# Patient Record
Sex: MALE | Race: WHITE
[De-identification: names, ages, dates, MRNs, and addresses within clinical notes are randomized per-mention and may not be internally consistent; named-entity substitution may affect disease eponyms.]

---

## 2018-03-04 ENCOUNTER — HOSPITAL ENCOUNTER (INPATIENT)
Dept: HOSPITAL 31 - C.ER | Age: 33
LOS: 2 days | Discharge: HOME | DRG: 89 | End: 2018-03-06
Attending: INTERNAL MEDICINE | Admitting: INTERNAL MEDICINE
Payer: MEDICAID

## 2018-03-04 VITALS — RESPIRATION RATE: 20 BRPM

## 2018-03-04 DIAGNOSIS — Z87.01: ICD-10-CM

## 2018-03-04 DIAGNOSIS — R82.71: ICD-10-CM

## 2018-03-04 DIAGNOSIS — J45.909: ICD-10-CM

## 2018-03-04 DIAGNOSIS — J18.9: Primary | ICD-10-CM

## 2018-03-04 LAB
ALBUMIN SERPL-MCNC: 4.5 G/DL (ref 3.5–5)
ALBUMIN/GLOB SERPL: 1.1 {RATIO} (ref 1–2.1)
ALT SERPL-CCNC: 43 U/L (ref 21–72)
APTT BLD: 34 SECONDS (ref 21–34)
AST SERPL-CCNC: 30 U/L (ref 17–59)
BACTERIA #/AREA URNS HPF: (no result) /[HPF]
BASE EXCESS BLDV CALC-SCNC: -4.9 MMOL/L (ref 0–2)
BASE EXCESS BLDV CALC-SCNC: 2.7 MMOL/L (ref 0–2)
BASOPHILS # BLD AUTO: 0 K/UL (ref 0–0.2)
BASOPHILS NFR BLD: 1 % (ref 0–2)
BILIRUB UR-MCNC: NEGATIVE MG/DL
BUN SERPL-MCNC: 12 MG/DL (ref 9–20)
CALCIUM SERPL-MCNC: 9.2 MG/DL (ref 8.6–10.4)
EOSINOPHIL # BLD AUTO: 0.1 K/UL (ref 0–0.7)
EOSINOPHIL NFR BLD: 1.2 % (ref 0–4)
ERYTHROCYTE [DISTWIDTH] IN BLOOD BY AUTOMATED COUNT: 13.1 % (ref 11.5–14.5)
GFR NON-AFRICAN AMERICAN: > 60
GLUCOSE UR STRIP-MCNC: NORMAL MG/DL
HGB BLD-MCNC: 15 G/DL (ref 12–18)
INR PPP: 1.2
LEUKOCYTE ESTERASE UR-ACNC: (no result) LEU/UL
LYMPHOCYTES # BLD AUTO: 1.3 K/UL (ref 1–4.3)
LYMPHOCYTES NFR BLD AUTO: 29.2 % (ref 20–40)
MAGNESIUM SERPL-MCNC: 2.1 MG/DL (ref 1.6–2.3)
MCH RBC QN AUTO: 25.8 PG (ref 27–31)
MCHC RBC AUTO-ENTMCNC: 33.7 G/DL (ref 33–37)
MCV RBC AUTO: 76.7 FL (ref 80–94)
MONOCYTES # BLD: 0.8 K/UL (ref 0–0.8)
MONOCYTES NFR BLD: 17.5 % (ref 0–10)
NEUTROPHILS # BLD: 2.3 K/UL (ref 1.8–7)
NEUTROPHILS NFR BLD AUTO: 51.1 % (ref 50–75)
NRBC BLD AUTO-RTO: 0.1 % (ref 0–2)
PCO2 BLDV: 27 MMHG (ref 40–60)
PCO2 BLDV: 50 MMHG (ref 40–60)
PH BLDV: 7.37 [PH] (ref 7.32–7.43)
PH BLDV: 7.43 [PH] (ref 7.32–7.43)
PH UR STRIP: 6 [PH] (ref 5–8)
PLATELET # BLD: 299 K/UL (ref 130–400)
PMV BLD AUTO: 7.3 FL (ref 7.2–11.7)
PROT UR STRIP-MCNC: NEGATIVE MG/DL
PROTHROMBIN TIME: 13.5 SECONDS (ref 9.7–12.2)
RBC # BLD AUTO: 5.83 MIL/UL (ref 4.4–5.9)
RBC # UR STRIP: (no result) /UL
SP GR UR STRIP: 1.02 (ref 1–1.03)
SQUAMOUS EPITHIAL: < 1 /HPF (ref 0–5)
URINE NITRATE: NEGATIVE
UROBILINOGEN UR-MCNC: NORMAL MG/DL (ref 0.2–1)
VENOUS BLOOD GAS PO2: 23 MM/HG (ref 30–55)
VENOUS BLOOD GAS PO2: 77 MM/HG (ref 30–55)
WBC # BLD AUTO: 4.5 K/UL (ref 4.8–10.8)

## 2018-03-04 RX ADMIN — DEXTROSE AND SODIUM CHLORIDE SCH MLS/HR: 5; 450 INJECTION, SOLUTION INTRAVENOUS at 22:45

## 2018-03-04 NOTE — C.PDOC
Time Seen by Provider: 03/04/18 15:25


Chief Complaint (Nursing): Fever


History Per: Patient


Onset/Duration Of Symptoms: Days (2)


Current Symptoms Are (Timing): Still Present


Associated Symptoms: Fever, Chills, Cough, Sputum


Severity: Moderate


Recent travel outside of the [place]: No


Additional History Per: Prior Records





Past Medical History


Reviewed: Historical Data, Nursing Documentation, Vital Signs


Vital Signs: 


 Last Vital Signs











Temp  98.4 F   03/04/18 17:37


 


Pulse  109 H  03/04/18 17:37


 


Resp  20   03/04/18 17:37


 


BP  127/77   03/04/18 17:37


 


Pulse Ox  97   03/04/18 17:37














- Medical History


PMH: Asthma


Family History: States: Unknown Family Hx





- Social History


Hx Tobacco Use: No


Hx Alcohol Use: No


Hx Substance Use: No





- Immunization History


Hx Tetanus Toxoid Vaccination: No


Hx Influenza Vaccination: No


Hx Pneumococcal Vaccination: No





Review Of Systems


Except As Marked, All Systems Reviewed And Found Negative.


Constitutional: Positive for: Fever, Chills, Weakness, Malaise


ENT: Positive for: Throat Pain


Respiratory: Positive for: Cough, Shortness of Breath, Sputum.  Negative for: 

Hemoptysis


Gastrointestinal: Negative for: Vomiting, Abdominal Pain, Diarrhea


Musculoskeletal: Negative for: Neck Pain


Skin: Negative for: Rash


Neurological: Negative for: Weakness, Numbness, Seizures, Altered Mental Status





Physical Exam





- Physical Exam


Appears: Non-toxic, No Acute Distress


Skin: Normal Color, Warm, Dry, No Rash


Head: Atraumatic, Normacephalic


Eye(s): bilateral: Normal Inspection, PERRL, EOMI


Oral Mucosa: Moist, No Drooling, No Trismus


Throat: Erythema, No Exudate, No Drooling, No Mass


Neck: Normal ROM, Supple


Cardiovascular: Rhythm Regular


Respiratory: No Accessory Muscle Use, Rhonchi (right)


Gastrointestinal/Abdominal: Soft, No Tenderness


Back: No CVA Tenderness


Extremity: Normal ROM


Neurological/Psych: Oriented x3, Normal Motor, Normal Sensation





ED Course And Treatment





- Laboratory Results


Result Diagrams: 


 03/04/18 16:22





 03/04/18 16:22


Interpretation Of Abnormal: Lactate 2.4


O2 Sat by Pulse Oximetry: 95


Pulse Ox Interpretation: Normal





- Radiology


CXR: Viewed By Me, Read By Radiologist


CXR Interpretation: Yes: Infiltrates (RML)





Progress





- Interventions


Interventions:: Observation, Intravenous fluid





- Medications Administered


Oral: Acetaminophen


Intravenous: Other (Abx)





- Data Reviewed


Data Reviewed: Lab, Diagnostic imaging, Old records





- Patient Status


Patient status: Partially improved





- Continuity of Care


Discussed patient case with:: Patient, ED Nurse, PMD





- Patient Plan


Patient Plan: Admission





Disposition


Discussed With : Brian Wild


Comment: He accepted pt on his service.


Doctor Will See Patient In The: Hospital


Counseled Patient/Family Regarding: Studies Performed, Diagnosis





- Disposition


Disposition: HOSPITALIZED


Disposition Time: 18:07


Condition: FAIR





- Clinical Impression


Clinical Impression: 


 Fever, Sepsis, Right middle lobe pneumonia

## 2018-03-04 NOTE — RAD
HISTORY:

Cough, fever  



COMPARISON:

No prior.



TECHNIQUE:

Chest PA and lateral



FINDINGS:



LUNGS:

Right middle lobe infiltrate.



PLEURA:

No significant pleural effusion identified. No pneumothorax apparent.



CARDIOVASCULAR:

Normal.



OSSEOUS STRUCTURES:

No significant abnormalities.



VISUALIZED UPPER ABDOMEN:

Normal.



OTHER FINDINGS:

None.



IMPRESSION:

Right middle lobe infiltrate.

## 2018-03-05 LAB
ALBUMIN SERPL-MCNC: 3.7 G/DL (ref 3.5–5)
ALBUMIN/GLOB SERPL: 1.2 {RATIO} (ref 1–2.1)
ALT SERPL-CCNC: 32 U/L (ref 21–72)
AST SERPL-CCNC: 26 U/L (ref 17–59)
BASOPHILS # BLD AUTO: 0.1 K/UL (ref 0–0.2)
BASOPHILS NFR BLD: 1.2 % (ref 0–2)
BUN SERPL-MCNC: 8 MG/DL (ref 9–20)
CALCIUM SERPL-MCNC: 8.6 MG/DL (ref 8.6–10.4)
EOSINOPHIL # BLD AUTO: 0.1 K/UL (ref 0–0.7)
EOSINOPHIL NFR BLD: 1.9 % (ref 0–4)
ERYTHROCYTE [DISTWIDTH] IN BLOOD BY AUTOMATED COUNT: 13.3 % (ref 11.5–14.5)
GFR NON-AFRICAN AMERICAN: > 60
HGB BLD-MCNC: 13.4 G/DL (ref 12–18)
LYMPHOCYTES # BLD AUTO: 1.8 K/UL (ref 1–4.3)
LYMPHOCYTES NFR BLD AUTO: 40.2 % (ref 20–40)
MCH RBC QN AUTO: 26 PG (ref 27–31)
MCHC RBC AUTO-ENTMCNC: 33.7 G/DL (ref 33–37)
MCV RBC AUTO: 77 FL (ref 80–94)
MONOCYTES # BLD: 0.7 K/UL (ref 0–0.8)
MONOCYTES NFR BLD: 15.7 % (ref 0–10)
NEUTROPHILS # BLD: 1.8 K/UL (ref 1.8–7)
NEUTROPHILS NFR BLD AUTO: 41 % (ref 50–75)
NRBC BLD AUTO-RTO: 0.1 % (ref 0–2)
PLATELET # BLD: 261 K/UL (ref 130–400)
PMV BLD AUTO: 7.6 FL (ref 7.2–11.7)
RBC # BLD AUTO: 5.15 MIL/UL (ref 4.4–5.9)
WBC # BLD AUTO: 4.4 K/UL (ref 4.8–10.8)

## 2018-03-05 RX ADMIN — IPRATROPIUM BROMIDE AND ALBUTEROL SULFATE SCH ML: .5; 3 SOLUTION RESPIRATORY (INHALATION) at 19:08

## 2018-03-05 RX ADMIN — PROMETHAZINE HYDROCHLORIDE PRN MG: 6.25 SYRUP ORAL at 10:03

## 2018-03-05 RX ADMIN — PROMETHAZINE HYDROCHLORIDE PRN MG: 6.25 SYRUP ORAL at 18:47

## 2018-03-05 RX ADMIN — IPRATROPIUM BROMIDE AND ALBUTEROL SULFATE SCH ML: .5; 3 SOLUTION RESPIRATORY (INHALATION) at 11:13

## 2018-03-05 RX ADMIN — FLUTICASONE PROPIONATE AND SALMETEROL SCH: 50; 250 POWDER RESPIRATORY (INHALATION) at 11:13

## 2018-03-05 RX ADMIN — DEXTROSE AND SODIUM CHLORIDE SCH MLS/HR: 5; 450 INJECTION, SOLUTION INTRAVENOUS at 18:48

## 2018-03-05 RX ADMIN — ENOXAPARIN SODIUM SCH MG: 40 INJECTION SUBCUTANEOUS at 09:23

## 2018-03-05 RX ADMIN — IPRATROPIUM BROMIDE AND ALBUTEROL SULFATE SCH: .5; 3 SOLUTION RESPIRATORY (INHALATION) at 16:00

## 2018-03-05 RX ADMIN — IPRATROPIUM BROMIDE AND ALBUTEROL SULFATE SCH ML: .5; 3 SOLUTION RESPIRATORY (INHALATION) at 00:53

## 2018-03-05 RX ADMIN — IPRATROPIUM BROMIDE AND ALBUTEROL SULFATE SCH ML: .5; 3 SOLUTION RESPIRATORY (INHALATION) at 07:38

## 2018-03-05 RX ADMIN — IPRATROPIUM BROMIDE AND ALBUTEROL SULFATE SCH ML: .5; 3 SOLUTION RESPIRATORY (INHALATION) at 04:21

## 2018-03-05 RX ADMIN — DEXTROSE AND SODIUM CHLORIDE SCH MLS/HR: 5; 450 INJECTION, SOLUTION INTRAVENOUS at 08:48

## 2018-03-05 NOTE — CP.PCM.PN
Subjective





- Date & Time of Evaluation


Date of Evaluation: 03/05/18


Time of Evaluation: 07:05





- Subjective


Subjective: 





PGY2 Resident - Medicine Progress Note





This 33 year old Male with PMHx of Asthma, presents to the ED c/o fevers, chills

, and cough for the past 3 days. 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 

been outdoors frequently with the changes in weather, and began to feel 

extremely ill this past Friday night. He did not record a home temperature, 

however admits to diaphoresis, chills, and overall lethargy. Saturday he began 

to cough intermittently, stating that whenever he gets sick, his asthma worsens

, causing him to cough. He has not tried any OTC medications. His asthma pump 

has helped his cough, however the fevers / chills have persisted. He denies any 

sick contacts.  Denies chest pain, abdominal pain, nausea / vomiting, diarrhea 

/ constipation, change in urination, urinary frequency, dysuria, or any 

additional acute complaints.





PMD: Dr. Wild


PMHx: Asthma


PSHx: none


Meds: see EMR


SocHx: Denies ETOH, denies tobacco, denies illicit drug use








Objective





- Vital Signs/Intake and Output


Vital Signs (last 24 hours): 


 











Temp Pulse Resp BP Pulse Ox


 


 98.7 F   103 H  20   112/74   96 


 


 03/05/18 08:11  03/05/18 08:11  03/05/18 08:11  03/05/18 08:11  03/05/18 08:11








Intake and Output: 


 











 03/05/18 03/05/18





 06:59 18:59


 


Intake Total 1550 


 


Output Total 250 


 


Balance 1300 














- Medications


Medications: 


 Current Medications





Acetaminophen (Tylenol 325mg Tab)  650 mg PO Q4 PRN


   PRN Reason: Fever >100.4 F


Albuterol/Ipratropium (Duoneb 3 Mg/0.5 Mg (3 Ml) Ud)  3 ml INH RQ4 [facility]


   Last Admin: 03/05/18 07:38 Dose:  3 ml


Enoxaparin Sodium (Lovenox)  40 mg SC DAILY [facility]


   Last Admin: 03/05/18 09:23 Dose:  40 mg


Ceftriaxone Sodium 1 gm/ (Sodium Chloride)  100 mls @ 100 mls/hr IVPB DAILY [facility]


   Last Admin: 03/05/18 09:23 Dose:  100 mls/hr


Azithromycin 500 mg/ Sodium (Chloride)  250 mls @ 166.667 mls/hr IVPB DAILY [facility]


Dextrose/Sodium Chloride (Dextrose 5%/0.45% Ns 1000 Ml)  1,000 mls @ 100 mls/hr 

IV .Q10H [facility]


   Last Admin: 03/05/18 08:48 Dose:  100 mls/hr


Promethazine HCl (Phenergan Syrup)  12.5 mg PO Q6 PRN


   PRN Reason: Cough


   Last Admin: 03/05/18 10:03 Dose:  12.5 mg











- Labs


Labs: 


 





 03/05/18 06:33 





 03/05/18 06:33 





 











PT  13.5 SECONDS (9.7-12.2)  H  03/04/18  16:22    


 


INR  1.2   03/04/18  16:22    


 


APTT  34 SECONDS (21-34)   03/04/18  16:22    














- Constitutional


Appears: Non-toxic, No Acute Distress





- Head Exam


Head Exam: ATRAUMATIC, NORMAL INSPECTION





- Eye Exam


Eye Exam: EOMI, Normal appearance





- ENT Exam


ENT Exam: Mucous Membranes Dry





- Neck Exam


Neck Exam: Lymphadenopathy.  absent: Tenderness





- Respiratory Exam


Respiratory Exam: Rales (Mid R lung field), Wheezes, NORMAL BREATHING PATTERN.  

absent: Clear to Ausculation Bilateral





- Cardiovascular Exam


Cardiovascular Exam: Tachycardia, +S1, +S2





- GI/Abdominal Exam


GI & Abdominal Exam: Soft, Normal Bowel Sounds.  absent: Tenderness





- Extremities Exam


Extremities Exam: Full ROM, Normal Inspection.  absent: Pedal Edema, Tenderness





- Back Exam


Back Exam: NORMAL INSPECTION.  absent: CVA tenderness (L), CVA tenderness (R)





- Neurological Exam


Neurological Exam: Alert, Awake, Oriented x3





- Psychiatric Exam


Psychiatric exam: Normal Affect, Normal Mood





- Skin


Skin Exam: Dry, Intact, Normal Color, Warm





Assessment and Plan





- Assessment and Plan (Free Text)


Assessment: 





Pneumonia


-CXR- R mid lobe infiltrate


-Ceftriaxone 1Gm IVPB qD


-Azithromycin 500mg IVPB qD


-Phenergan Syrup)  12.5 mg PO Q6 PRN cough.





Sepsis


-pt with fever 103F on admission, currently 99F


-pt tachycardic on admission, , currently 103


-Lactate on admission 2.4 -> 1.1


-Tylenol 325mg Tab)  650 mg PO Q4 PRN


- UC (+), however patient is asymptomatic


-f/u UC in am





Asthma


Duoneb 3 Mg/0.5 Mg (3 Ml) Ud)  3 ml INH RQ4 [facility]


Advair diskus 250/50 Q12H





Prophylaxis


Lovenox)  40 mg SC DAILY [facility]


D5/0.5NS at 100cc/hr


Patient is mobile, no SCDs





Case discussed with attending. All medical management as per Dr. ROC Wild.

## 2018-03-06 VITALS — SYSTOLIC BLOOD PRESSURE: 111 MMHG | HEART RATE: 90 BPM | DIASTOLIC BLOOD PRESSURE: 76 MMHG

## 2018-03-06 VITALS — TEMPERATURE: 98.4 F | OXYGEN SATURATION: 95 %

## 2018-03-06 LAB
ALBUMIN SERPL-MCNC: 3.5 G/DL (ref 3.5–5)
ALBUMIN/GLOB SERPL: 1 {RATIO} (ref 1–2.1)
ALT SERPL-CCNC: 38 U/L (ref 21–72)
AST SERPL-CCNC: 21 U/L (ref 17–59)
BASOPHILS # BLD AUTO: 0 K/UL (ref 0–0.2)
BASOPHILS NFR BLD: 0.8 % (ref 0–2)
BUN SERPL-MCNC: 11 MG/DL (ref 9–20)
CALCIUM SERPL-MCNC: 8.8 MG/DL (ref 8.6–10.4)
EOSINOPHIL # BLD AUTO: 0.2 K/UL (ref 0–0.7)
EOSINOPHIL NFR BLD: 5.8 % (ref 0–4)
ERYTHROCYTE [DISTWIDTH] IN BLOOD BY AUTOMATED COUNT: 13.2 % (ref 11.5–14.5)
GFR NON-AFRICAN AMERICAN: > 60
HGB BLD-MCNC: 13.3 G/DL (ref 12–18)
LYMPHOCYTES # BLD AUTO: 1.9 K/UL (ref 1–4.3)
LYMPHOCYTES NFR BLD AUTO: 45.7 % (ref 20–40)
MAGNESIUM SERPL-MCNC: 2.1 MG/DL (ref 1.6–2.3)
MCH RBC QN AUTO: 25.5 PG (ref 27–31)
MCHC RBC AUTO-ENTMCNC: 33.5 G/DL (ref 33–37)
MCV RBC AUTO: 76.1 FL (ref 80–94)
MONOCYTES # BLD: 0.5 K/UL (ref 0–0.8)
MONOCYTES NFR BLD: 13.4 % (ref 0–10)
NEUTROPHILS # BLD: 1.4 K/UL (ref 1.8–7)
NEUTROPHILS NFR BLD AUTO: 34.3 % (ref 50–75)
NRBC BLD AUTO-RTO: 0.1 % (ref 0–2)
PLATELET # BLD: 285 K/UL (ref 130–400)
PMV BLD AUTO: 7.2 FL (ref 7.2–11.7)
RBC # BLD AUTO: 5.21 MIL/UL (ref 4.4–5.9)
WBC # BLD AUTO: 4.1 K/UL (ref 4.8–10.8)

## 2018-03-06 RX ADMIN — IPRATROPIUM BROMIDE AND ALBUTEROL SULFATE SCH ML: .5; 3 SOLUTION RESPIRATORY (INHALATION) at 11:28

## 2018-03-06 RX ADMIN — IPRATROPIUM BROMIDE AND ALBUTEROL SULFATE SCH ML: .5; 3 SOLUTION RESPIRATORY (INHALATION) at 03:42

## 2018-03-06 RX ADMIN — DEXTROSE AND SODIUM CHLORIDE SCH MLS/HR: 5; 450 INJECTION, SOLUTION INTRAVENOUS at 06:12

## 2018-03-06 RX ADMIN — ENOXAPARIN SODIUM SCH MG: 40 INJECTION SUBCUTANEOUS at 09:15

## 2018-03-06 RX ADMIN — FLUTICASONE PROPIONATE AND SALMETEROL SCH PUFF: 50; 250 POWDER RESPIRATORY (INHALATION) at 08:27

## 2018-03-06 RX ADMIN — IPRATROPIUM BROMIDE AND ALBUTEROL SULFATE SCH ML: .5; 3 SOLUTION RESPIRATORY (INHALATION) at 08:26

## 2018-03-06 RX ADMIN — IPRATROPIUM BROMIDE AND ALBUTEROL SULFATE SCH ML: .5; 3 SOLUTION RESPIRATORY (INHALATION) at 00:26

## 2018-03-06 NOTE — HP
HISTORY OF PRESENT ILLNESS:   The patient was admitted to hospital with

chief complaint of fever 103, chills, fatigue.  The patient came to the ER

and found to have pneumonia and advised admission.  The patient has a

history of asthma.



SOCIAL HISTORY:  The patient was a .  The patient does

not smoke or drink.



PHYSICAL EXAMINATION:

GENERAL:  The patient is awake, alert, oriented.

VITAL SIGNS:  Temperature is 98, pulse is 90.

HEENT:  Within normal limits.

NECK:  Supple.

CHEST:  Symmetrical.

HEART:  Regular.

ABDOMEN:  Soft.

EXTREMITIES:  No edema.



IMPRESSION AND PLAN:  The patient is status post pneumonia.  The patient is

to get IV antibiotics, bronchodilators, and  ____ medication.  Monitor

lactic acid.





__________________________________________

Brian Wild MD





DD:  03/05/2018 10:06:46

DT:  03/05/2018 13:13:27

Job # [id number]

## 2018-03-06 NOTE — CP.PCM.PN
Subjective





- Date & Time of Evaluation


Date of Evaluation: 03/06/18


Time of Evaluation: 07:25





- Subjective


Subjective: 





PGY2 Resident - Medicine Progress Note





Patient seen and examined at bedside. He reports his cough is improving with 

current medications. He is breathing will, c/o of mild wheezes. He denies 

burning on urination. His stool has been softer than usual, but he denies 

diarrhea. He is asking to have his fluids DC'd. 12-point review of systems is 

otherwise negative without any additional acute complaints.


---





Patient is stable for discharge home per Dr. Wild. Patient should resume all 

medications as outlined in this document. Additionally, patient should take the 

new medications listed below (scripts provided). Please make an appointment and 

follow up with your Primary Doctor within one week of discharge. Patient should 

return to ED immediately if symptoms return or worsen.  Instructions discussed 

with patient who understood and agreed.





Newly prescribed medications:


Levaquin 750mg PO qD x7days


Tamiflu 75mg PO q12H x5days





Objective





- Vital Signs/Intake and Output


Vital Signs (last 24 hours): 


 











Temp Pulse Resp BP Pulse Ox


 


 98.4 F   90   20   111/76   95 


 


 03/06/18 07:43  03/06/18 07:43  03/06/18 07:43  03/06/18 07:43  03/06/18 07:43








Intake and Output: 


 











 03/06/18 03/06/18





 06:59 18:59


 


Intake Total 1100 1040


 


Balance 1100 1040














- Medications


Medications: 


 Current Medications





Acetaminophen (Tylenol 325mg Tab)  650 mg PO Q4 PRN


   PRN Reason: Fever >100.4 F


   Last Admin: 03/06/18 10:02 Dose:  650 mg


Albuterol/Ipratropium (Duoneb 3 Mg/0.5 Mg (3 Ml) Ud)  3 ml INH RQ4 [facility]


   Last Admin: 03/06/18 08:26 Dose:  3 ml


Enoxaparin Sodium (Lovenox)  40 mg SC DAILY [facility]


   Last Admin: 03/06/18 09:15 Dose:  40 mg


Famotidine (Pepcid)  20 mg PO DAILY [facility]


   Last Admin: 03/06/18 09:15 Dose:  20 mg


Ceftriaxone Sodium 1 gm/ (Sodium Chloride)  100 mls @ 100 mls/hr IVPB DAILY [facility]


   Last Admin: 03/06/18 10:04 Dose:  100 mls/hr


Azithromycin 500 mg/ Sodium (Chloride)  250 mls @ 166.667 mls/hr IVPB DAILY [facility]


   Last Admin: 03/06/18 10:02 Dose:  166.667 mls/hr


Promethazine HCl (Phenergan Syrup)  12.5 mg PO Q6 PRN


   PRN Reason: Cough


   Last Admin: 03/05/18 18:47 Dose:  12.5 mg


Fluticasone/Salmeterol (Advair Diskus 250/50)  1 puff INH RQ12 [facility]


   Last Admin: 03/06/18 08:27 Dose:  1 puff











- Labs


Labs: 


 





 03/06/18 06:10 





 03/06/18 06:10 





 











PT  13.5 SECONDS (9.7-12.2)  H  03/04/18  16:22    


 


INR  1.2   03/04/18  16:22    


 


APTT  34 SECONDS (21-34)   03/04/18  16:22    














- Additional Findings


Additional findings: 





- Constitutional


Appears: Non-toxic, No Acute Distress





- Head Exam


Head Exam: ATRAUMATIC, NORMAL INSPECTION





- Eye Exam


Eye Exam: EOMI, Normal appearance





- ENT Exam


ENT Exam: Mucous Membranes Dry





- Neck Exam


Neck Exam: absent: Tenderness, Lymphadenopathy





- Respiratory Exam


Respiratory Exam: NORMAL BREATHING PATTERN.  absent: Rales, Wheezes





- Cardiovascular Exam


Cardiovascular Exam: Regular Rate, +S1, +S2





- GI/Abdominal Exam


GI & Abdominal Exam: Soft, Normal Bowel Sounds.  absent: Tenderness





- Extremities Exam


Extremities Exam: Full ROM, Normal Inspection.  absent: Pedal Edema, Tenderness





- Back Exam


Back Exam: NORMAL INSPECTION.  absent: CVA tenderness (L), CVA tenderness (R)





- Neurological Exam


Neurological Exam: Alert, Awake, Oriented x3





- Psychiatric Exam


Psychiatric exam: Normal Affect, Normal Mood





- Skin


Skin Exam: Dry, Intact, Normal Color, Warm





Assessment and Plan





- Assessment and Plan (Free Text)


Assessment: 





Pneumonia


3/6: breathing well, much improved.


-CXR- R mid lobe infiltrate


-Ceftriaxone 1Gm IVPB qD


-Azithromycin 500mg IVPB qD


-Phenergan Syrup)  12.5 mg PO Q6 PRN cough.





Sepsis


3/6: pt afebrile since first day of admission; HR regular.


-pt with fever 103F on admission, currently 99F


-pt tachycardic on admission, , currently 103


-Lactate on admission 2.4 -> 1.1


-Tylenol 325mg Tab)  650 mg PO Q4 PRN


- UC (+), however patient is asymptomatic


-f/u UC in am





Asthma


3/6: breathing well, much improved.


Duoneb 3 Mg/0.5 Mg (3 Ml) Ud)  3 ml INH RQ4 [facility]


Advair diskus 250/50 Q12H





Prophylaxis


Lovenox)  40 mg SC DAILY [facility]


D5/0.5NS at 100cc/hr


Patient is mobile, no SCDs





Case discussed with attending. All medical management as per Dr. ROC Wild.





---





Patient is stable for discharge home per Dr. Wild. Patient should resume all 

medications as outlined in this document. Additionally, patient should take the 

new medications listed below (scripts provided). Please make an appointment and 

follow up with your Primary Doctor within one week of discharge. Patient should 

return to ED immediately if symptoms return or worsen.  Instructions discussed 

with patient who understood and agreed.





Newly prescribed medications:


Levaquin 750mg PO qD x7days


Tamiflu 75mg PO q12H x5days